# Patient Record
Sex: MALE | Race: WHITE | NOT HISPANIC OR LATINO | Employment: FULL TIME | URBAN - METROPOLITAN AREA
[De-identification: names, ages, dates, MRNs, and addresses within clinical notes are randomized per-mention and may not be internally consistent; named-entity substitution may affect disease eponyms.]

---

## 2018-01-15 ENCOUNTER — HOSPITAL ENCOUNTER (OUTPATIENT)
Dept: PREADMISSION TESTING | Facility: HOSPITAL | Age: 48
Discharge: HOME | End: 2018-01-15
Attending: ORTHOPAEDIC SURGERY | Admitting: ORTHOPAEDIC SURGERY

## 2018-01-15 LAB
ALBUMIN SERPL-MCNC: 4.2 G/DL (ref 3.5–4.8)
ALBUMIN/GLOB SERPL: 1.6 {RATIO} (ref 1–1.7)
ALP SERPL-CCNC: 51 IU/L (ref 32–91)
ALT SERPL-CCNC: 28 IU/L (ref 17–63)
ANION GAP SERPL CALC-SCNC: 10.9 MMOL/L (ref 10–20)
AST SERPL-CCNC: 26 IU/L (ref 15–41)
BASOPHILS # BLD AUTO: 0 10*3/UL (ref 0–0.2)
BASOPHILS NFR BLD AUTO: 0 % (ref 0–2)
BILIRUB SERPL-MCNC: 1.1 MG/DL (ref 0.3–1.2)
BUN SERPL-MCNC: 12 MG/DL (ref 8–20)
BUN/CREAT SERPL: 12 (ref 6.2–20.3)
CALCIUM SERPL-MCNC: 9.7 MG/DL (ref 8.9–10.3)
CHLORIDE SERPL-SCNC: 103 MMOL/L (ref 101–111)
CONV CO2: 27 MMOL/L (ref 22–32)
CONV TOTAL PROTEIN: 6.9 G/DL (ref 6.1–7.9)
CREAT UR-MCNC: 1 MG/DL (ref 0.7–1.2)
DIFFERENTIAL METHOD BLD: (no result)
EOSINOPHIL # BLD AUTO: 0.1 10*3/UL (ref 0–0.3)
EOSINOPHIL # BLD AUTO: 2 % (ref 0–3)
ERYTHROCYTE [DISTWIDTH] IN BLOOD BY AUTOMATED COUNT: 12.7 % (ref 11.5–14.5)
GLOBULIN UR ELPH-MCNC: 2.7 G/DL (ref 2.5–3.8)
GLUCOSE SERPL-MCNC: 108 MG/DL (ref 65–99)
HCT VFR BLD AUTO: 47.6 % (ref 40–54)
HGB BLD-MCNC: 16.3 G/DL (ref 14–18)
LYMPHOCYTES # BLD AUTO: 1.9 10*3/UL (ref 0.8–4.8)
LYMPHOCYTES NFR BLD AUTO: 29 % (ref 18–42)
MCH RBC QN AUTO: 31.4 PG (ref 26–32)
MCHC RBC AUTO-ENTMCNC: 34.3 G/DL (ref 32–36)
MCV RBC AUTO: 91.6 FL (ref 80–94)
MONOCYTES # BLD AUTO: 0.6 10*3/UL (ref 0.1–1.3)
MONOCYTES NFR BLD AUTO: 9 % (ref 2–11)
NEUTROPHILS # BLD AUTO: 4.1 10*3/UL (ref 2.3–8.6)
NEUTROPHILS NFR BLD AUTO: 60 % (ref 50–75)
NRBC BLD AUTO-RTO: 0 /100{WBCS}
NRBC/RBC NFR BLD MANUAL: 0 10*3/UL
PLATELET # BLD AUTO: 187 10*3/UL (ref 150–450)
PMV BLD AUTO: 7.8 FL (ref 7.4–10.4)
POTASSIUM SERPL-SCNC: 3.9 MMOL/L (ref 3.6–5.1)
RBC # BLD AUTO: 5.19 10*6/UL (ref 4.6–6)
SODIUM SERPL-SCNC: 137 MMOL/L (ref 136–144)
WBC # BLD AUTO: 6.8 10*3/UL (ref 4.5–11.5)

## 2019-12-16 ENCOUNTER — ON CAMPUS - OUTPATIENT (AMBULATORY)
Dept: URBAN - METROPOLITAN AREA HOSPITAL 2 | Facility: HOSPITAL | Age: 49
End: 2019-12-16
Payer: COMMERCIAL

## 2019-12-16 ENCOUNTER — OFFICE (AMBULATORY)
Dept: URBAN - METROPOLITAN AREA PATHOLOGY 4 | Facility: PATHOLOGY | Age: 49
End: 2019-12-16
Payer: COMMERCIAL

## 2019-12-16 VITALS
DIASTOLIC BLOOD PRESSURE: 84 MMHG | DIASTOLIC BLOOD PRESSURE: 81 MMHG | SYSTOLIC BLOOD PRESSURE: 131 MMHG | HEART RATE: 62 BPM | RESPIRATION RATE: 16 BRPM | HEART RATE: 68 BPM | SYSTOLIC BLOOD PRESSURE: 119 MMHG | DIASTOLIC BLOOD PRESSURE: 76 MMHG | SYSTOLIC BLOOD PRESSURE: 159 MMHG | DIASTOLIC BLOOD PRESSURE: 79 MMHG | HEART RATE: 69 BPM | OXYGEN SATURATION: 98 % | HEART RATE: 71 BPM | DIASTOLIC BLOOD PRESSURE: 86 MMHG | HEART RATE: 77 BPM | OXYGEN SATURATION: 96 % | RESPIRATION RATE: 18 BRPM | SYSTOLIC BLOOD PRESSURE: 148 MMHG | OXYGEN SATURATION: 95 % | DIASTOLIC BLOOD PRESSURE: 89 MMHG | TEMPERATURE: 97.1 F | SYSTOLIC BLOOD PRESSURE: 126 MMHG | SYSTOLIC BLOOD PRESSURE: 129 MMHG | SYSTOLIC BLOOD PRESSURE: 132 MMHG | SYSTOLIC BLOOD PRESSURE: 117 MMHG | OXYGEN SATURATION: 99 % | OXYGEN SATURATION: 97 % | DIASTOLIC BLOOD PRESSURE: 94 MMHG | HEART RATE: 70 BPM | HEART RATE: 66 BPM | DIASTOLIC BLOOD PRESSURE: 91 MMHG | OXYGEN SATURATION: 93 % | WEIGHT: 217 LBS

## 2019-12-16 DIAGNOSIS — Z12.11 ENCOUNTER FOR SCREENING FOR MALIGNANT NEOPLASM OF COLON: ICD-10-CM

## 2019-12-16 DIAGNOSIS — K62.1 RECTAL POLYP: ICD-10-CM

## 2019-12-16 LAB
GI HISTOLOGY: A. UNSPECIFIED: (no result)
GI HISTOLOGY: PDF REPORT: (no result)

## 2019-12-16 PROCEDURE — 88305 TISSUE EXAM BY PATHOLOGIST: CPT | Mod: 33 | Performed by: INTERNAL MEDICINE

## 2019-12-16 PROCEDURE — 45385 COLONOSCOPY W/LESION REMOVAL: CPT | Mod: 33 | Performed by: INTERNAL MEDICINE

## 2024-01-08 NOTE — PROGRESS NOTES
Subjective     Chief Complaint   Patient presents with    Neck Pain         Previous Treatment: Ibuprofen,Tylenol, cervical KEL, right MBB/RFA, left MBB    HPI: Jaxson Finney is a 53 y.o. male with hypertension who presents with complaints of chronic neck pain.  Pain is bilateral.  It is significantly worse with range of motion, particularly forward flexion.  He also reports intermittent burning pain down his arms into his second and third digits.  He denies weakness in his extremities, imbalance/incoordination, and difficulty with fine motor tasks.  He is undergone cervical KEL in the past without relief.  He underwent right-sided cervical medial branch blocks with subsequent radiofrequency ablation that provided temporary relief.  He recently underwent a left medial branch blocks with plans for RFA in the future.  He sees, with pain management for these injections.        PMH:  Past Medical History:   Diagnosis Date    Hypertension     Neck pain          Current Outpatient Medications:     acetaminophen (TYLENOL) 500 MG tablet, Take 1 tablet by mouth Every 6 (Six) Hours As Needed for Mild Pain., Disp: , Rfl:     ibuprofen (ADVIL,MOTRIN) 200 MG tablet, Take 3 tablets by mouth Every 6 (Six) Hours As Needed for Mild Pain., Disp: , Rfl:     Multiple Vitamin (MULTI VITAMIN DAILY PO), Take  by mouth., Disp: , Rfl:     sildenafil (Viagra) 25 MG tablet, , Disp: , Rfl:      No Known Allergies     Past Surgical History:   Procedure Laterality Date    OPEN ANTERIOR SHOULDER RECONSTRUCTION Left     PLANTAR FASCIA SURGERY Right         History reviewed. No pertinent family history.      Social Hx:  Social History     Tobacco Use   Smoking Status Never   Smokeless Tobacco Not on file      Alcohol Use: Not on file      Social History     Substance and Sexual Activity   Drug Use Defer          Review of Systems   Constitutional:  Positive for activity change.   HENT: Negative.     Eyes: Negative.    Respiratory: Negative.    "  Cardiovascular: Negative.    Gastrointestinal: Negative.    Endocrine: Negative.    Genitourinary: Negative.    Musculoskeletal:  Positive for arthralgias, gait problem, myalgias, neck pain and neck stiffness.   Allergic/Immunologic: Negative.    Hematological: Negative.    Psychiatric/Behavioral: Negative.           Objective     /70   Pulse 74   Resp 16   Ht 182.9 cm (72\")   Wt 110 kg (242 lb 12.8 oz)   SpO2 98%   BMI 32.93 kg/m²    Body mass index is 32.93 kg/m².      Physical Exam  Vitals reviewed.   Constitutional:       General: He is not in acute distress.     Appearance: Normal appearance. He is well-developed and well-groomed.   HENT:      Head: Normocephalic and atraumatic.   Eyes:      Extraocular Movements: Extraocular movements intact.      Pupils: Pupils are equal, round, and reactive to light.   Cardiovascular:      Rate and Rhythm: Normal rate and regular rhythm.      Pulses: Normal pulses.   Pulmonary:      Effort: Pulmonary effort is normal. No respiratory distress.   Musculoskeletal:         General: No swelling or tenderness. Normal range of motion.      Cervical back: Normal range of motion.   Skin:     General: Skin is warm and dry.      Findings: No bruising or rash.   Neurological:      General: No focal deficit present.      Mental Status: He is alert and oriented to person, place, and time.      Sensory: Sensation is intact.      Motor: Motor function is intact.      Gait: Gait is intact.      Deep Tendon Reflexes:      Reflex Scores:       Tricep reflexes are 2+ on the right side and 2+ on the left side.       Bicep reflexes are 2+ on the right side and 2+ on the left side.       Brachioradialis reflexes are 2+ on the right side and 2+ on the left side.       Patellar reflexes are 2+ on the right side and 2+ on the left side.       Achilles reflexes are 2+ on the right side.     Comments:             Neurological Exam  Mental Status  Alert. Oriented to person, place, and " time.    Cranial Nerves  CN III, IV, VI: Extraocular movements intact bilaterally. Pupils equal round and reactive to light bilaterally.    Motor  Normal muscle bulk throughout. No fasciculations present. Normal muscle tone. Strength is 5/5 in all four extremities except as noted.                                             Right                     Left  Deltoid                                   5                          5   Biceps                                   5                          5  Brachioradialis                      5                          5   Triceps                                  5                          5   Wrist extensor                       5                          5    Sensory  Normal sensation.Light touch is normal in upper and lower extremities. Pinprick is normal in upper and lower extremities.     Reflexes  Deep tendon reflexes are 2+ and symmetric except as noted.                                            Right                      Left  Brachioradialis                    2+                         2+  Biceps                                 2+                         2+  Triceps                                2+                         2+  Patellar                                2+                         2+  Achilles                                2+                            Right pathological reflexes: Armida's absent. Ankle clonus absent.  Left pathological reflexes: Armida's absent. Ankle clonus absent.    Gait   Normal gait.          Results Review  I personally reviewed and interpreted the images from the following studies:    MRI cervical spine without contrast  4/13/2023  Mild cervical spondylosis without significant central or neuroforaminal stenosis.  Reversal of the normal lower cervical lordosis.  No significant central stenosis, cord compression, or cord signal change.        Assessment & Plan     MDM: Jaxson Finney is a 53 y.o. male who presents with chronic  neck pain, particularly worse with forward flexion of the neck.  He has mild intermittent radicular symptoms.  He is not subjectively or objectively myelopathic. No focal deficits on exam.  MRI cervical spine shows no central or foraminal stenosis.  There is some reversal of the cervical lordosis in the lower cervical spine.    Patient's symptoms appear largely mechanical, especially with the flexion component.  This raises concern for possible dynamic instability, and as such I have ordered flexion-extension studies to rule this out.  I also recommend patient undergo formal physical therapy to work on strengthening the supporting musculature of the shoulders and neck.  He should continue to follow-up with, with pain management for injection therapy as needed.  I will see patient again once he completes physical therapy and his x-ray.  Patient is agreeable to this plan.       Diagnosis Plan   1. DDD (degenerative disc disease), cervical  Ambulatory Referral to Physical Therapy Evaluate and treat; Heat, Electrotherapy; Soft Tissue Mobilizaton; Stretching, Strengthening, ROM    XR spine cervical ap and lat w flex and ext      2. Neck pain, musculoskeletal  Ambulatory Referral to Physical Therapy Evaluate and treat; Heat, Electrotherapy; Soft Tissue Mobilizaton; Stretching, Strengthening, ROM    XR spine cervical ap and lat w flex and ext          Return in about 8 weeks (around 3/5/2024).      Jaxson Finney  reports that he has never smoked. He does not have any smokeless tobacco history on file..         This patient was examined wearing appropriate personal protective equipment.            Han Talbot PA-C    01/17/24  09:09 EST      Part of this note may be an electronic transcription/translation of spoken language to printed text using the Dragon Dictation System.

## 2024-01-09 ENCOUNTER — OFFICE VISIT (OUTPATIENT)
Dept: NEUROSURGERY | Facility: CLINIC | Age: 54
End: 2024-01-09
Payer: COMMERCIAL

## 2024-01-09 VITALS
HEIGHT: 72 IN | WEIGHT: 242.8 LBS | BODY MASS INDEX: 32.89 KG/M2 | HEART RATE: 74 BPM | RESPIRATION RATE: 16 BRPM | SYSTOLIC BLOOD PRESSURE: 130 MMHG | DIASTOLIC BLOOD PRESSURE: 70 MMHG | OXYGEN SATURATION: 98 %

## 2024-01-09 DIAGNOSIS — M54.2 NECK PAIN, MUSCULOSKELETAL: ICD-10-CM

## 2024-01-09 DIAGNOSIS — M50.30 DDD (DEGENERATIVE DISC DISEASE), CERVICAL: Primary | ICD-10-CM

## 2024-01-09 RX ORDER — SILDENAFIL 25 MG/1
TABLET, FILM COATED ORAL
COMMUNITY

## 2024-01-10 ENCOUNTER — PATIENT ROUNDING (BHMG ONLY) (OUTPATIENT)
Dept: NEUROSURGERY | Facility: CLINIC | Age: 54
End: 2024-01-10
Payer: COMMERCIAL

## 2024-01-10 NOTE — PROGRESS NOTES
A note has been sent to the patient's home address for PATIENT [place] with [facility] [place]

## 2024-01-11 ENCOUNTER — HOSPITAL ENCOUNTER (OUTPATIENT)
Dept: GENERAL RADIOLOGY | Facility: HOSPITAL | Age: 54
Discharge: HOME | End: 2024-01-11
Payer: COMMERCIAL

## 2024-01-11 DIAGNOSIS — M50.30 DDD (DEGENERATIVE DISC DISEASE), CERVICAL: ICD-10-CM

## 2024-01-11 DIAGNOSIS — M54.2 NECK PAIN, MUSCULOSKELETAL: ICD-10-CM

## 2024-01-11 PROCEDURE — 72050 X-RAY EXAM NECK SPINE 4/5VWS: CPT

## 2024-01-16 ENCOUNTER — ANCILLARY ORDERS (OUTPATIENT)
Dept: OTHER | Facility: HOSPITAL | Age: 54
End: 2024-01-16
Payer: COMMERCIAL

## 2024-02-01 ENCOUNTER — TELEPHONE (OUTPATIENT)
Dept: NEUROSURGERY | Facility: CLINIC | Age: 54
End: 2024-02-01
Payer: COMMERCIAL

## 2024-02-21 NOTE — PROGRESS NOTES
Subjective     Chief Complaint   Patient presents with    Follow-up         Previous Treatment: PT, Ibuprofen,tylenol,cervical KEL,right MBB/RFA,left MBB    HPI: Jaxson Finney is a 53 y.o. male with hypertension who presents with complaints of chronic neck pain.  Pain is bilateral.  It is significantly worse with range of motion, particularly forward flexion.  He also reports intermittent burning pain down his arms into his second and third digits.  He denies weakness in his extremities, imbalance/incoordination, and difficulty with fine motor tasks.  He is undergone cervical KEL in the past without relief.  He underwent right-sided cervical medial branch blocks with subsequent radiofrequency ablation that provided temporary relief.  He recently underwent a left medial branch blocks with plans for RFA in the future.  He sees, with pain management for these injections    Interval history: 3/5/2024  Patient reports moderate relief of his neck pain with physical therapy.  He still has occasional pain, particularly with increased physical activity.  He has no new complaints or neurologic changes today        PMH:  Past Medical History:   Diagnosis Date    Hypertension     Neck pain          Current Outpatient Medications:     acetaminophen (TYLENOL) 500 MG tablet, Take 1 tablet by mouth Every 6 (Six) Hours As Needed for Mild Pain., Disp: , Rfl:     ibuprofen (ADVIL,MOTRIN) 200 MG tablet, Take 3 tablets by mouth Every 6 (Six) Hours As Needed for Mild Pain., Disp: , Rfl:     Multiple Vitamin (MULTI VITAMIN DAILY PO), Take  by mouth., Disp: , Rfl:     sildenafil (Viagra) 25 MG tablet, , Disp: , Rfl:      No Known Allergies     Past Surgical History:   Procedure Laterality Date    OPEN ANTERIOR SHOULDER RECONSTRUCTION Left     PLANTAR FASCIA SURGERY Right         History reviewed. No pertinent family history.      Social Hx:  Social History     Tobacco Use   Smoking Status Never   Smokeless Tobacco Not on file      Alcohol  "Use: Not on file      Social History     Substance and Sexual Activity   Drug Use Defer          Review of Systems   Constitutional:  Positive for activity change.   HENT: Negative.     Eyes: Negative.    Respiratory: Negative.     Cardiovascular: Negative.    Gastrointestinal: Negative.    Endocrine: Negative.    Genitourinary: Negative.    Musculoskeletal:  Positive for arthralgias, myalgias, neck pain and neck stiffness.   Skin: Negative.    Allergic/Immunologic: Negative.    Neurological:         Sharp/dull ache in neck   Hematological: Negative.    Psychiatric/Behavioral:  Positive for sleep disturbance.          Objective     /85   Pulse 74   Resp 18   Ht 182.9 cm (72\")   Wt 108 kg (237 lb)   SpO2 98%   BMI 32.14 kg/m²    Body mass index is 32.14 kg/m².      Physical Exam  Vitals reviewed.   Constitutional:       General: He is not in acute distress.     Appearance: Normal appearance. He is well-developed and well-groomed.   HENT:      Head: Normocephalic and atraumatic.   Eyes:      Extraocular Movements: Extraocular movements intact.      Pupils: Pupils are equal, round, and reactive to light.   Cardiovascular:      Rate and Rhythm: Normal rate and regular rhythm.      Pulses: Normal pulses.   Pulmonary:      Effort: Pulmonary effort is normal. No respiratory distress.   Musculoskeletal:         General: No swelling or tenderness. Normal range of motion.      Cervical back: Normal range of motion.   Skin:     General: Skin is warm and dry.      Findings: No bruising or rash.   Neurological:      General: No focal deficit present.      Mental Status: He is alert and oriented to person, place, and time.      Sensory: Sensation is intact.      Motor: Motor function is intact.      Deep Tendon Reflexes: Reflexes are normal and symmetric.      Comments:             Neurological Exam  Mental Status  Alert. Oriented to person, place, and time.    Cranial Nerves  CN III, IV, VI: Extraocular movements " intact bilaterally. Pupils equal round and reactive to light bilaterally.    Motor  Normal muscle bulk throughout. No fasciculations present. Normal muscle tone. Strength is 5/5 in all four extremities except as noted.                                             Right                     Left  Deltoid                                   5                          5   Biceps                                   5                          5  Brachioradialis                      5                          5   Triceps                                  5                          5   Wrist extensor                       5                          5    Sensory  Normal sensation.Light touch is normal in upper and lower extremities. Pinprick is normal in upper and lower extremities.     Reflexes  Deep tendon reflexes are 2+ and symmetric in all four extremities.    Right pathological reflexes: Armida's absent.  Left pathological reflexes: Armida's absent.    Gait  Casual gait is normal including stance, stride, and arm swing.          Results Review  I personally reviewed and interpreted the images from the following studies:    MRI cervical spine without contrast  4/13/2023  Mild cervical spondylosis without significant central or neuroforaminal stenosis.  Reversal of the normal lower cervical lordosis.  No significant central stenosis, cord compression, or cord signal change.    X-ray cervical spine with flexion and extension  1/11/2024  Grade 1 spondylolisthesis of C5 on C6.  Mild dynamic instability evidenced by worsening on flexion and reduction on extension.          Assessment & Plan     MDM: Jaxson Finney is a 53 y.o. male presenting with chronic mechanical neck pain.  He is neurologically intact with no focal deficits.  He has no significant central or foraminal stenosis on prior MRI.  He does have dynamic spondylolisthesis at C5-6 which does correlate with his mechanical pain.  He did get moderate relief with  physical therapy.  He sees pain management for injections with plans for facet ablations in the future.    Could consider surgical intervention to stabilize C5-6 which is his primary pain contributor.  However, his pain seems to be well enough controlled with physical therapy and injections at the moment so I recommend he continue with conservative management for now.  If these measures are no longer adequately able to control his pain then he may follow-up with Dr. Arce for surgical evaluation at that time.  Patient is agreeable to this plan.       Diagnosis Plan   1. DDD (degenerative disc disease), cervical        2. Spondylolisthesis of cervical region        3. Neck pain, musculoskeletal            Return if symptoms worsen or fail to improve.      Jaxson JULIO Finney  reports that he has never smoked. He does not have any smokeless tobacco history on file.              This patient was examined wearing appropriate personal protective equipment.            Han Talbot PA-C    03/14/24  15:13 EDT      Part of this note may be an electronic transcription/translation of spoken language to printed text using the Dragon Dictation System.

## 2024-03-05 ENCOUNTER — OFFICE VISIT (OUTPATIENT)
Dept: NEUROSURGERY | Facility: CLINIC | Age: 54
End: 2024-03-05
Payer: COMMERCIAL

## 2024-03-05 VITALS
DIASTOLIC BLOOD PRESSURE: 85 MMHG | HEIGHT: 72 IN | HEART RATE: 74 BPM | WEIGHT: 237 LBS | BODY MASS INDEX: 32.1 KG/M2 | OXYGEN SATURATION: 98 % | RESPIRATION RATE: 18 BRPM | SYSTOLIC BLOOD PRESSURE: 133 MMHG

## 2024-03-05 DIAGNOSIS — M50.30 DDD (DEGENERATIVE DISC DISEASE), CERVICAL: Primary | ICD-10-CM

## 2024-03-05 DIAGNOSIS — M43.12 SPONDYLOLISTHESIS OF CERVICAL REGION: ICD-10-CM

## 2024-03-05 DIAGNOSIS — M54.2 NECK PAIN, MUSCULOSKELETAL: ICD-10-CM

## 2024-03-05 PROCEDURE — 99213 OFFICE O/P EST LOW 20 MIN: CPT
